# Patient Record
Sex: MALE | Race: WHITE | NOT HISPANIC OR LATINO | Employment: FULL TIME | URBAN - METROPOLITAN AREA
[De-identification: names, ages, dates, MRNs, and addresses within clinical notes are randomized per-mention and may not be internally consistent; named-entity substitution may affect disease eponyms.]

---

## 2024-07-18 ENCOUNTER — APPOINTMENT (OUTPATIENT)
Dept: PRIMARY CARE | Facility: CLINIC | Age: 29
End: 2024-07-18
Payer: COMMERCIAL

## 2024-07-31 ENCOUNTER — APPOINTMENT (OUTPATIENT)
Dept: PRIMARY CARE | Facility: CLINIC | Age: 29
End: 2024-07-31
Payer: COMMERCIAL

## 2024-07-31 ENCOUNTER — LAB (OUTPATIENT)
Dept: LAB | Facility: LAB | Age: 29
End: 2024-07-31
Payer: COMMERCIAL

## 2024-07-31 VITALS
BODY MASS INDEX: 24.34 KG/M2 | HEART RATE: 52 BPM | OXYGEN SATURATION: 97 % | HEIGHT: 70 IN | DIASTOLIC BLOOD PRESSURE: 65 MMHG | SYSTOLIC BLOOD PRESSURE: 110 MMHG | RESPIRATION RATE: 16 BRPM | WEIGHT: 170 LBS

## 2024-07-31 DIAGNOSIS — G89.29 CHRONIC PAIN OF LEFT KNEE: ICD-10-CM

## 2024-07-31 DIAGNOSIS — M25.562 CHRONIC PAIN OF LEFT KNEE: ICD-10-CM

## 2024-07-31 DIAGNOSIS — S29.011A PECTORAL MUSCLE RUPTURE, INITIAL ENCOUNTER: ICD-10-CM

## 2024-07-31 DIAGNOSIS — Z00.00 ANNUAL PHYSICAL EXAM: ICD-10-CM

## 2024-07-31 DIAGNOSIS — M25.551 RIGHT HIP PAIN: ICD-10-CM

## 2024-07-31 DIAGNOSIS — Z00.00 ANNUAL PHYSICAL EXAM: Primary | ICD-10-CM

## 2024-07-31 PROBLEM — H52.203 ASTIGMATISM, BILATERAL: Status: ACTIVE | Noted: 2024-07-31

## 2024-07-31 PROBLEM — H10.12: Status: RESOLVED | Noted: 2024-07-31 | Resolved: 2024-07-31

## 2024-07-31 PROBLEM — H04.123 DRY EYES, BILATERAL: Status: ACTIVE | Noted: 2024-07-31

## 2024-07-31 PROBLEM — H52.7 REFRACTIVE ERROR: Status: ACTIVE | Noted: 2024-07-31

## 2024-07-31 PROBLEM — H01.019 BLEPHARITIS, ULCERATIVE: Status: ACTIVE | Noted: 2024-07-31

## 2024-07-31 LAB
ALBUMIN SERPL BCP-MCNC: 4.3 G/DL (ref 3.4–5)
ALP SERPL-CCNC: 76 U/L (ref 33–120)
ALT SERPL W P-5'-P-CCNC: 16 U/L (ref 10–52)
ANION GAP SERPL CALC-SCNC: 10 MMOL/L (ref 10–20)
AST SERPL W P-5'-P-CCNC: 18 U/L (ref 9–39)
BASOPHILS # BLD AUTO: 0.02 X10*3/UL (ref 0–0.1)
BASOPHILS NFR BLD AUTO: 0.4 %
BILIRUB SERPL-MCNC: 0.8 MG/DL (ref 0–1.2)
BUN SERPL-MCNC: 14 MG/DL (ref 6–23)
CALCIUM SERPL-MCNC: 9.9 MG/DL (ref 8.6–10.6)
CHLORIDE SERPL-SCNC: 103 MMOL/L (ref 98–107)
CHOLEST SERPL-MCNC: 221 MG/DL (ref 0–199)
CHOLESTEROL/HDL RATIO: 3.5
CO2 SERPL-SCNC: 29 MMOL/L (ref 21–32)
CREAT SERPL-MCNC: 0.94 MG/DL (ref 0.5–1.3)
EGFRCR SERPLBLD CKD-EPI 2021: >90 ML/MIN/1.73M*2
EOSINOPHIL # BLD AUTO: 0.12 X10*3/UL (ref 0–0.7)
EOSINOPHIL NFR BLD AUTO: 2.6 %
ERYTHROCYTE [DISTWIDTH] IN BLOOD BY AUTOMATED COUNT: 13 % (ref 11.5–14.5)
GLUCOSE SERPL-MCNC: 87 MG/DL (ref 74–99)
HCT VFR BLD AUTO: 46.6 % (ref 41–52)
HDLC SERPL-MCNC: 63.7 MG/DL
HGB BLD-MCNC: 15.4 G/DL (ref 13.5–17.5)
IMM GRANULOCYTES # BLD AUTO: 0.01 X10*3/UL (ref 0–0.7)
IMM GRANULOCYTES NFR BLD AUTO: 0.2 % (ref 0–0.9)
LDLC SERPL CALC-MCNC: 146 MG/DL
LYMPHOCYTES # BLD AUTO: 1.54 X10*3/UL (ref 1.2–4.8)
LYMPHOCYTES NFR BLD AUTO: 33 %
MCH RBC QN AUTO: 28.8 PG (ref 26–34)
MCHC RBC AUTO-ENTMCNC: 33 G/DL (ref 32–36)
MCV RBC AUTO: 87 FL (ref 80–100)
MONOCYTES # BLD AUTO: 0.56 X10*3/UL (ref 0.1–1)
MONOCYTES NFR BLD AUTO: 12 %
NEUTROPHILS # BLD AUTO: 2.42 X10*3/UL (ref 1.2–7.7)
NEUTROPHILS NFR BLD AUTO: 51.8 %
NON HDL CHOLESTEROL: 157 MG/DL (ref 0–149)
NRBC BLD-RTO: 0 /100 WBCS (ref 0–0)
PLATELET # BLD AUTO: 240 X10*3/UL (ref 150–450)
POTASSIUM SERPL-SCNC: 4.3 MMOL/L (ref 3.5–5.3)
PROT SERPL-MCNC: 6.9 G/DL (ref 6.4–8.2)
RBC # BLD AUTO: 5.34 X10*6/UL (ref 4.5–5.9)
SODIUM SERPL-SCNC: 138 MMOL/L (ref 136–145)
TRIGL SERPL-MCNC: 57 MG/DL (ref 0–149)
TSH SERPL-ACNC: 1.29 MIU/L (ref 0.44–3.98)
VLDL: 11 MG/DL (ref 0–40)
WBC # BLD AUTO: 4.7 X10*3/UL (ref 4.4–11.3)

## 2024-07-31 PROCEDURE — 36415 COLL VENOUS BLD VENIPUNCTURE: CPT

## 2024-07-31 PROCEDURE — 80053 COMPREHEN METABOLIC PANEL: CPT

## 2024-07-31 PROCEDURE — 1036F TOBACCO NON-USER: CPT | Performed by: INTERNAL MEDICINE

## 2024-07-31 PROCEDURE — 99385 PREV VISIT NEW AGE 18-39: CPT | Performed by: INTERNAL MEDICINE

## 2024-07-31 PROCEDURE — 80061 LIPID PANEL: CPT

## 2024-07-31 PROCEDURE — 85025 COMPLETE CBC W/AUTO DIFF WBC: CPT

## 2024-07-31 PROCEDURE — 3008F BODY MASS INDEX DOCD: CPT | Performed by: INTERNAL MEDICINE

## 2024-07-31 PROCEDURE — 84443 ASSAY THYROID STIM HORMONE: CPT

## 2024-07-31 ASSESSMENT — ENCOUNTER SYMPTOMS
ENDOCRINE NEGATIVE: 1
RESPIRATORY NEGATIVE: 1
EYES NEGATIVE: 1
HEMATOLOGIC/LYMPHATIC NEGATIVE: 1
ALLERGIC/IMMUNOLOGIC NEGATIVE: 1
CONSTITUTIONAL NEGATIVE: 1
ARTHRALGIAS: 1
PSYCHIATRIC NEGATIVE: 1
CARDIOVASCULAR NEGATIVE: 1
NEUROLOGICAL NEGATIVE: 1
GASTROINTESTINAL NEGATIVE: 1

## 2024-07-31 NOTE — ASSESSMENT & PLAN NOTE
Due to trochanteric bursitis and get X rays since he reports  remote injuries and educate stretches and Educate strengthening exercises

## 2024-07-31 NOTE — ASSESSMENT & PLAN NOTE
Left knee pain due to strain and get X rays and consider PT and MRI and minimize strain with exercise

## 2024-07-31 NOTE — PROGRESS NOTES
"Subjective   Patient ID: Tho Allen is a 29 y.o. male who presents for New Patient Visit (New patient-est. Care/Pec muscle pull/Right hip labrum injury/Left knee tenderness/inflammation). Status Post several injuries  - Status Post fall skiing and several falls on the right side burning right hip after exercises possibly right hip trochanteric bursitis (mostly after squatting) - left nee inflammation no specific injury aching at times he has a [employer] hurts the most with reverse lounges left pectoral muscle tenderness due to possible strain     HPI     Review of Systems   Constitutional: Negative.    HENT: Negative.     Eyes: Negative.    Respiratory: Negative.     Cardiovascular: Negative.    Gastrointestinal: Negative.    Endocrine: Negative.    Musculoskeletal:  Positive for arthralgias.   Skin: Negative.    Allergic/Immunologic: Negative.    Neurological: Negative.    Hematological: Negative.    Psychiatric/Behavioral: Negative.     All other systems reviewed and are negative.      Objective   Pulse 73   Resp 16   Ht 1.778 m (5' 10\")   Wt 77.1 kg (170 lb)   SpO2 97%   BMI 24.39 kg/m²   Blood pressure 110/65, pulse 52, resp. rate 16, height 1.778 m (5' 10\"), weight 77.1 kg (170 lb), SpO2 97%.   Physical Exam  Vitals and nursing note reviewed.   Constitutional:       Appearance: Normal appearance.   HENT:      Head: Normocephalic and atraumatic.      Right Ear: Tympanic membrane, ear canal and external ear normal.      Left Ear: Tympanic membrane, ear canal and external ear normal. There is no impacted cerumen.      Nose: Nose normal.      Mouth/Throat:      Mouth: Mucous membranes are moist.      Pharynx: Oropharynx is clear.   Eyes:      Extraocular Movements: Extraocular movements intact.      Conjunctiva/sclera: Conjunctivae normal.      Pupils: Pupils are equal, round, and reactive to light.   Cardiovascular:      Rate and Rhythm: Normal rate and regular rhythm.      Pulses: Normal pulses.    "   Heart sounds: Normal heart sounds. No murmur heard.  Pulmonary:      Effort: Pulmonary effort is normal. No respiratory distress.      Breath sounds: Normal breath sounds. No stridor. No wheezing, rhonchi or rales.   Chest:      Chest wall: No tenderness.   Abdominal:      General: Abdomen is flat. Bowel sounds are normal. There is no distension.      Palpations: Abdomen is soft. There is no mass.      Tenderness: There is no abdominal tenderness. There is no right CVA tenderness, left CVA tenderness, guarding or rebound.      Hernia: No hernia is present.   Musculoskeletal:         General: Tenderness present. Normal range of motion.      Cervical back: Normal range of motion and neck supple.   Skin:     General: Skin is warm.      Capillary Refill: Capillary refill takes less than 2 seconds.   Neurological:      General: No focal deficit present.      Mental Status: He is alert.      Cranial Nerves: No cranial nerve deficit.      Sensory: No sensory deficit.      Motor: No weakness.      Coordination: Coordination normal.      Gait: Gait normal.      Deep Tendon Reflexes: Reflexes normal.   Psychiatric:         Mood and Affect: Mood normal.         Behavior: Behavior normal. Behavior is cooperative.         Thought Content: Thought content normal.         Judgment: Judgment normal.         Assessment/Plan   Problem List Items Addressed This Visit             ICD-10-CM    Annual physical exam - Primary Z00.00     No recent hospitalizations.    All medications reviewed and reconciled by me the provider..  No use of controlled substances or opiates.    Family history, social history reviewed, no changes.    Patient does not smoke.    Patient does not drink.    Patient hydrates adequately daily.  Eats a well-balanced healthy diet.     Exercises adequately including walking and doing weightbearing exercises.    Patient denies any difficulty with memory or cognition.     Denies any difficulty with hearing.  Patient  does not wear hearing aids.    No fall risk.  No recent falls.  Denies any difficulty walking.    Patient with no history of depression anxiety, denies any loss of interest, no feeling of sadness, no lack of motivation.           Relevant Orders    CBC and Auto Differential    Comprehensive Metabolic Panel    Lipid Panel    TSH with reflex to Free T4 if abnormal    Right hip pain M25.551     Due to trochanteric bursitis and get X rays since he reports  remote injuries and educate stretches and Educate strengthening exercises          Chronic pain of left knee M25.562, G89.29     Left knee pain due to strain and get X rays and consider PT and MRI and minimize strain with exercise          Pectoral muscle rupture, initial encounter S29.011A

## 2024-08-06 ENCOUNTER — HOSPITAL ENCOUNTER (OUTPATIENT)
Dept: RADIOLOGY | Facility: HOSPITAL | Age: 29
Discharge: HOME | End: 2024-08-06
Payer: COMMERCIAL

## 2024-08-06 DIAGNOSIS — G89.29 CHRONIC PAIN OF LEFT KNEE: ICD-10-CM

## 2024-08-06 DIAGNOSIS — M25.562 CHRONIC PAIN OF LEFT KNEE: ICD-10-CM

## 2024-08-06 DIAGNOSIS — M25.551 RIGHT HIP PAIN: ICD-10-CM

## 2024-08-06 PROCEDURE — 73560 X-RAY EXAM OF KNEE 1 OR 2: CPT | Mod: LEFT SIDE | Performed by: RADIOLOGY

## 2024-08-06 PROCEDURE — 73502 X-RAY EXAM HIP UNI 2-3 VIEWS: CPT | Mod: RIGHT SIDE | Performed by: RADIOLOGY

## 2024-08-06 PROCEDURE — 73502 X-RAY EXAM HIP UNI 2-3 VIEWS: CPT | Mod: RT

## 2024-08-06 PROCEDURE — 73560 X-RAY EXAM OF KNEE 1 OR 2: CPT | Mod: LT

## 2024-09-17 ENCOUNTER — OFFICE VISIT (OUTPATIENT)
Dept: ORTHOPEDIC SURGERY | Facility: HOSPITAL | Age: 29
End: 2024-09-17
Payer: COMMERCIAL

## 2024-09-17 ENCOUNTER — HOSPITAL ENCOUNTER (OUTPATIENT)
Dept: RADIOLOGY | Facility: HOSPITAL | Age: 29
Discharge: HOME | End: 2024-09-17
Payer: COMMERCIAL

## 2024-09-17 DIAGNOSIS — S29.011A PECTORALIS MUSCLE STRAIN, INITIAL ENCOUNTER: Primary | ICD-10-CM

## 2024-09-17 DIAGNOSIS — M25.512 LEFT SHOULDER PAIN, UNSPECIFIED CHRONICITY: ICD-10-CM

## 2024-09-17 PROCEDURE — 73030 X-RAY EXAM OF SHOULDER: CPT | Mod: LT

## 2024-09-17 PROCEDURE — 73030 X-RAY EXAM OF SHOULDER: CPT | Mod: LEFT SIDE | Performed by: RADIOLOGY

## 2024-09-17 PROCEDURE — 99204 OFFICE O/P NEW MOD 45 MIN: CPT | Performed by: FAMILY MEDICINE

## 2024-09-17 PROCEDURE — 99214 OFFICE O/P EST MOD 30 MIN: CPT | Performed by: FAMILY MEDICINE

## 2024-09-17 NOTE — PROGRESS NOTES
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    Today, 9/17/2024, patient presents with left lateral chest pain.  He reports May 2022, he was jumping up and swinging his arms up to get to his machinery when he felt pain on the left chest lateral to his nipple.  This intensified in June 2022 when he was in the gym doing heavy dips, incline presses, chest flies.  For the past 2 years, he has been trying to fix it himself with stretching, massage and rest.  However given that it is still not better, patient is now here for further evaluation.  He denies any further trauma or injury.  He has no difficulty with his job as a .  This only occurs when he does any chest workouts in the gym.    He has no other complaints.    Review of Systems  Constitutional: no fever, no chills, not feeling tired, no recent weight gain and no recent weight loss.   ENT: no nosebleeds.   Cardiovascular: no chest pain.   Respiratory: no shortness of breath and no cough.   Gastrointestinal: no abdominal pain, no nausea, no diarrhea and no vomiting.   Musculoskeletal: as noted in HPI and no arthralgias.   Integumentary: no rashes and no skin wound.   Neurological: no headache.   Psychiatric: no sleep disturbances and no depression.   Endocrine: no muscle weakness and no muscle cramps.   Hematologic/Lymphatic: no swollen glands and no tendency for easy bruising.    Physical Examination:     Left chest   Inspection: No obvious deformity of the left chest compared to contralateral side.  No erythema, ecchymosis, rash, lesion.  Range of motion: Full forward flexion, abduction, adduction, external rotation, internal rotation.  Notes at the end of range of motion of pulling sensation over the axilla.  Palpation: Mild tenderness to palpation in left axilla  over pec major insertion to humerus. Minimal tenderness of pec major clavicular head. No tenderness over sternocostal of pec major.   Strength: 5/5 pec major contraction though painful over left axilla over pec major insertion to humerus  Neurovascular intact.     Constitutional: General appearance = Alert and in no acute distress. Well developed, well nourished.   Head and face: Normal.    Eyes: External Eye, Conjunctiva and Lids=Normal external exam; pupils were equal in size, round, reactive to light (PERRL) and extraocular movements intact (EOMI).   Ears, Nose, Mouth, and Throat: External inspection of ears and nose=Normal.   Hearing= Normal.    Neck: No neck mass was observed. Supple.   Pulmonary: Respiratory effort = No respiratory distress.   Cardiovascular: Examination of extremities= No peripheral edema.   Skin and subcutaneous tissue: Normal skin color and pigmentation, normal skin turgor, and no rash; palpation of skin and subcutaneous tissue=Normal.    Neurologic: Sensation= Normal.    Psychiatric: Judgment and insight= Intact.  Orientation to person, place, and time: Alert and oriented x 3.  Mood and affect= Normal.    Imaging:  Radiographs of the left shoulder obtained by Dr. Herrera 9/17/2024 were reviewed and revealed calcification near the intertubercular groove of the humerus on oblique view.  The studies were reviewed by me personally in the office today.    Problem List Items Addressed This Visit    None  Visit Diagnoses         Codes    Pectoralis muscle strain, initial encounter    -  Primary S29.011A    Relevant Orders    MR shoulder left wo IV contrast    Left shoulder pain, unspecified chronicity     M25.512    Relevant Orders    XR shoulder left 2+ views    MR shoulder left wo IV contrast          Assessment and Plan:  We reviewed the exam and x-ray findings and discussed the conservative and surgical treatment options.  He likely has pectoralis major muscle strain with tendinopathy. Given  radiographs revealing calcification near the intertubercular groove of the humerus on oblique view, it is possible that patient had small tear of his pectoralis major muscle.  However, he does not have any obvious deformity on inspection or palpation of his pectoralis muscle and has full strength. We discussed a trial of formal physical therapy versus obtaining MRI left shoulder to further evaluate the extent of injury. We agreed to obtain MRI left shoulder and provided home exercise program for pectoral muscle strengthening.  Follow-up based on MRI results.    **This note was dictated using Dragon speech recognition software and was not corrected for spelling or grammatical errors**.    Jesus Urbano MD  Primary Care Sports Medicine Fellow  [facility] [place]  [facility]

## 2024-09-17 NOTE — LETTER
September 17, 2024     Leonardo Sarah MD  [address] [facility]  [facility] [address]    Patient: Tho Allen   YOB: 1995   Date of Visit: 9/17/2024       Dear Dr. Leonardo Sarah MD:    Thank you for referring Tho Allen to me for evaluation. Below are my notes for this consultation.  If you have questions, please do not hesitate to call me. I look forward to following your patient along with you.       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    Today, 9/17/2024, patient presents with left lateral chest pain.  He reports May 2022, he was jumping up and swinging his arms up to get to his machinery when he felt pain on the left chest lateral to his nipple.  This intensified in June 2022 when he was in the gym doing heavy dips, incline presses, chest flies.  For the past 2 years, he has been trying to fix it himself with stretching, massage and rest.  However given that it is still not better, patient is now here for further evaluation.  He denies any further trauma or injury.  He has no difficulty with his job as a .  This only occurs when he does any chest workouts in the gym.    He has no other complaints.    Review of Systems  Constitutional: no fever, no chills, not feeling tired, no recent weight gain and no recent weight loss.   ENT: no nosebleeds.   Cardiovascular: no chest pain.   Respiratory: no shortness of breath and no cough.   Gastrointestinal: no abdominal pain, no nausea, no diarrhea and no vomiting.   Musculoskeletal: as noted in HPI and no arthralgias.   Integumentary: no rashes and no skin  wound.   Neurological: no headache.   Psychiatric: no sleep disturbances and no depression.   Endocrine: no muscle weakness and no muscle cramps.   Hematologic/Lymphatic: no swollen glands and no tendency for easy bruising.    Physical Examination:     Left chest   Inspection: No obvious deformity of the left chest compared to contralateral side.  No erythema, ecchymosis, rash, lesion.  Range of motion: Full forward flexion, abduction, adduction, external rotation, internal rotation.  Notes at the end of range of motion of pulling sensation over the axilla.  Palpation: Mild tenderness to palpation in left axilla over pec major insertion to humerus. Minimal tenderness of pec major clavicular head. No tenderness over sternocostal of pec major.   Strength: 5/5 pec major contraction though painful over left axilla over pec major insertion to humerus  Neurovascular intact.     Constitutional: General appearance = Alert and in no acute distress. Well developed, well nourished.   Head and face: Normal.    Eyes: External Eye, Conjunctiva and Lids=Normal external exam; pupils were equal in size, round, reactive to light (PERRL) and extraocular movements intact (EOMI).   Ears, Nose, Mouth, and Throat: External inspection of ears and nose=Normal.   Hearing= Normal.    Neck: No neck mass was observed. Supple.   Pulmonary: Respiratory effort = No respiratory distress.   Cardiovascular: Examination of extremities= No peripheral edema.   Skin and subcutaneous tissue: Normal skin color and pigmentation, normal skin turgor, and no rash; palpation of skin and subcutaneous tissue=Normal.    Neurologic: Sensation= Normal.    Psychiatric: Judgment and insight= Intact.  Orientation to person, place, and time: Alert and oriented x 3.  Mood and affect= Normal.    Imaging:  Radiographs of the left shoulder obtained by Dr. Herrera 9/17/2024 were reviewed and revealed calcification near the intertubercular groove of the humerus on oblique  view.  The studies were reviewed by me personally in the office today.    Problem List Items Addressed This Visit    None  Visit Diagnoses         Codes    Pectoralis muscle strain, initial encounter    -  Primary S29.011A    Relevant Orders    MR shoulder left wo IV contrast    Left shoulder pain, unspecified chronicity     M25.512    Relevant Orders    XR shoulder left 2+ views    MR shoulder left wo IV contrast          Assessment and Plan:  We reviewed the exam and x-ray findings and discussed the conservative and surgical treatment options.  He likely has pectoralis major muscle strain with tendinopathy. Given radiographs revealing calcification near the intertubercular groove of the humerus on oblique view, it is possible that patient had small tear of his pectoralis major muscle.  However, he does not have any obvious deformity on inspection or palpation of his pectoralis muscle and has full strength. We discussed a trial of formal physical therapy versus obtaining MRI left shoulder to further evaluate the extent of injury. We agreed to obtain MRI left shoulder and provided home exercise program for pectoral muscle strengthening.  Follow-up based on MRI results.    **This note was dictated using Dragon speech recognition software and was not corrected for spelling or grammatical errors**.    Jesus Urbano MD  Primary Care Sports Medicine Fellow  [facility] [place]  [facility]    Attestation with edits by Placido Herrera MD at 9/17/2024  2:18 PM:    Attending Note     Trainee role: Fellow    Trainee discussed patient with Dr. Herrera          I saw and evaluated the patient. I personally obtained the key and critical portions of the history and physical exam or was physically present for key and critical portions performed by the trainee. I reviewed the trainee's documentation and discussed the patient with the trainee. I agree with the trainee's medical decision  making, as documented on the trainee's note.     **He has a strain to the myotendinous junction of the left pectoralis major muscle with some periosteal changes at the insertion of the proximal humerus.  I do not feel he needs immediate surgery but we will try to obtain an MRI for further evaluation of the area causing his chronic pain.    Placido Herrera MD  Sports Medicine Specialist  [facility] [place]

## 2024-10-03 ENCOUNTER — HOSPITAL ENCOUNTER (OUTPATIENT)
Dept: RADIOLOGY | Facility: HOSPITAL | Age: 29
Discharge: HOME | End: 2024-10-03
Payer: COMMERCIAL

## 2024-10-03 DIAGNOSIS — M25.512 LEFT SHOULDER PAIN, UNSPECIFIED CHRONICITY: ICD-10-CM

## 2024-10-03 DIAGNOSIS — S29.011A PECTORALIS MUSCLE STRAIN, INITIAL ENCOUNTER: ICD-10-CM

## 2024-10-03 PROCEDURE — 73221 MRI JOINT UPR EXTREM W/O DYE: CPT | Mod: LT

## 2024-10-08 ENCOUNTER — OFFICE VISIT (OUTPATIENT)
Dept: ORTHOPEDIC SURGERY | Facility: HOSPITAL | Age: 29
End: 2024-10-08
Payer: COMMERCIAL

## 2024-10-08 DIAGNOSIS — M25.512 LEFT SHOULDER PAIN, UNSPECIFIED CHRONICITY: ICD-10-CM

## 2024-10-08 DIAGNOSIS — S29.011D PECTORALIS MUSCLE STRAIN, SUBSEQUENT ENCOUNTER: Primary | ICD-10-CM

## 2024-10-08 PROCEDURE — 99213 OFFICE O/P EST LOW 20 MIN: CPT | Performed by: FAMILY MEDICINE

## 2024-10-08 NOTE — PROGRESS NOTES
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    On 9/17/2024, patient presents with left lateral chest pain.  He reports May 2022, he was jumping up and swinging his arms up to get to his machinery when he felt pain on the left chest lateral to his nipple.  This intensified in June 2022 when he was in the gym doing heavy dips, incline presses, chest flies.  For the past 2 years, he has been trying to fix it himself with stretching, massage and rest.  However given that it is still not better, patient is now here for further evaluation.  He denies any further trauma or injury.  He has no difficulty with his job as a .  This only occurs when he does any chest workouts in the gym. We discussed he likely has a pectoralis major strain with tendinopathy. We agreed to an MRI of the left shoulder to evaluate for a possible tear and will follow-up after to discuss results.    Today, on 10/8/2024, he returns for follow-up and to review his MRI results. He says his pain is unchanged and continues to be in the left lateral chest and part of his axilla. He is still able to do his daily activities, but has continued to modify activities at the gym because of the pain. He says that he had done physical therapy in the past informally, and he is not currently doing any type of physical therapy for this.    He has no other complaints.    Physical Examination:     LEFT CHEST EXAM:  Inspection: No obvious deformity of the left chest compared to contralateral side.  No erythema, ecchymosis, rash, lesion.  Range of motion: Full forward flexion, abduction, adduction, external rotation, internal rotation.  Palpation: Mild tenderness to palpation in the left axilla over the pectoralis major insertion to humerus, as well as in the left lateral  chest over the clavicular head. No tenderness over the sternocostal head.  Strength: 5/5 pectoralis major contraction although painful over the left axilla and lateral chest wall with resisted internal rotation  Neurovascularly intact.     Imaging:  === 10/3/2024 ===  MR SHOULDER LEFT WO IV CONTRAST  IMPRESSION:  No evidence of internal derangement left shoulder.  Only the distal portions of the pectoralis are visualized on this examination but the portions visualized do not confirm any sizable pectoralis injury. Dedicated complete pectoralis study could be considered if there is a high degree of clinical concern for medial pectoralis injury.  Signed by: Mukul Lennon 10/4/2024 5:59 AM    === 9/17/2024 ===  XR SHOULDER LEFT 2+ VIEWS  IMPRESSION:  Normal radiographs of the left shoulder  Signed by: Brendan Retana 9/18/2024 8:13 PM    Problem List Items Addressed This Visit    None  Visit Diagnoses         Codes    Pectoralis muscle strain, subsequent encounter    -  Primary S29.011D    Left shoulder pain, unspecified chronicity     M25.512          Assessment and Plan:  We reviewed the exam and x-ray findings and discussed the conservative and surgical treatment options. We discussed that his MRI did not demonstrate a tear to the pectoralis major or other acute findings that would correlate with his ongoing pain. His pain and exam are relatively unchanged compared to his last visit. At this time, we discussed that we could try restarting formal physical therapy to see if this helps with his pain. We could also consider referral to a shoulder surgeon for another evaluation to see if there is anything interventional that can be done to help him. He would like a referral at this time. He was referred to one of our orthopedic surgery shoulder colleagues, Dr. Jaspreet Daugherty.    **This note was dictated using Dragon speech recognition software and was not corrected for spelling or grammatical errors**.    Maykel Nicolas,  MD, MEd  Primary Care Sports Medicine Fellow, PGY-4  [facility]

## 2024-10-25 ENCOUNTER — OFFICE VISIT (OUTPATIENT)
Dept: ORTHOPEDIC SURGERY | Facility: HOSPITAL | Age: 29
End: 2024-10-25
Payer: COMMERCIAL

## 2024-10-25 VITALS — HEIGHT: 70 IN | WEIGHT: 170 LBS | BODY MASS INDEX: 24.34 KG/M2

## 2024-10-25 DIAGNOSIS — S29.011D PECTORALIS MUSCLE STRAIN, SUBSEQUENT ENCOUNTER: Primary | ICD-10-CM

## 2024-10-25 PROCEDURE — 3008F BODY MASS INDEX DOCD: CPT | Performed by: STUDENT IN AN ORGANIZED HEALTH CARE EDUCATION/TRAINING PROGRAM

## 2024-10-25 PROCEDURE — 99213 OFFICE O/P EST LOW 20 MIN: CPT | Performed by: STUDENT IN AN ORGANIZED HEALTH CARE EDUCATION/TRAINING PROGRAM

## 2024-10-25 ASSESSMENT — PAIN - FUNCTIONAL ASSESSMENT: PAIN_FUNCTIONAL_ASSESSMENT: 0-10

## 2024-10-25 ASSESSMENT — PAIN SCALES - GENERAL: PAINLEVEL_OUTOF10: 1

## 2024-10-30 NOTE — PROGRESS NOTES
PRIMARY CARE PHYSICIAN: Leonardo Sarah MD  REFERRING PROVIDER: No referring provider defined for this encounter.     CONSULT ORTHOPAEDIC: Shoulder Evaluation    ASSESSMENT & PLAN    Impression/Plan: This is a 29-year-old male who presents for evaluation of left chest wall pain.  Recent MRI of the left shoulder reveals no evidence of internal derangement.  The patient is concerned about his pectoralis musculature.  Based on his young age and persistence of symptoms despite a home exercise program and prolonged therapy we have recommended moving forward with an MRI of the left chest to evaluate his pectoralis musculature and tendon.  He is in understanding agree with this plan.  He will follow-up after the MRI discuss findings and further management options.    SUBJECTIVE  CHIEF COMPLAINT:   Chief Complaint   Patient presents with    Left Shoulder - Pain        HPI: Tho Allen is a 29 y.o. patient. Tho Allen presents for evaluation of left chest wall pain.  He is most recently evaluated by our sports medicine colleagues on 10/8/2024.  He was worked up with an MRI of the left shoulder which did not reveal any intra-articular or rotator cuff issues.  He continues to have chest wall pain and is interested in discussion of possible pack injury.  The patient reports that in May 2022 while on machinery he felt a pain to the left lateral chest wall.  This intensified in June 2022 and for the past 2 years he has been working on home exercise program without significant improvements of his pain.  He has no previous history of shoulder surgeries or injections.  There are no reports of neck pain.  No reports of distal numbness or tingling.    REVIEW OF SYSTEMS  Constitutional: See HPI for pain assessment, No significant weight loss, recent trauma  Cardiovascular: No chest pain, shortness of breath  Respiratory: No difficulty breathing, cough  Gastrointestinal: No nausea, vomiting, diarrhea, constipation  Musculoskeletal:  "Noted in HPI, positive for pain, restricted motion, stiffness  Integumentary: No rashes, easy bruising, redness   Neurological: no numbness or tingling in extremities, no gait disturbances   Psychiatric: No mood changes, memory changes, social issues  Heme/Lymph: no excessive swelling, easy bruising, excessive bleeding  ENT: No hearing changes  Eyes: No vision changes    Past Medical History:   Diagnosis Date    Allergic conjunctivitis, left eye 07/31/2024    Other specified postprocedural states     History of surgery on arm        No Known Allergies     No past surgical history on file.     No family history on file.     Social History     Socioeconomic History    Marital status: Single     Spouse name: Not on file    Number of children: Not on file    Years of education: Not on file    Highest education level: Not on file   Occupational History    Not on file   Tobacco Use    Smoking status: Never    Smokeless tobacco: Never   Substance and Sexual Activity    Alcohol use: Yes     Comment: social    Drug use: Never    Sexual activity: Not on file   Other Topics Concern    Not on file   Social History Narrative    Not on file     Social Drivers of Health     Financial Resource Strain: Not on file   Food Insecurity: Not on file   Transportation Needs: Not on file   Physical Activity: Not on file   Stress: Not on file   Social Connections: Not on file   Intimate Partner Violence: Not on file   Housing Stability: Not on file        CURRENT MEDICATIONS:   Current Outpatient Medications   Medication Sig Dispense Refill    geqberv-tcgpdt-sxzatkpx-tretin 5-0.1-0.1-0.025 % solution Apply topically once daily.       No current facility-administered medications for this visit.        OBJECTIVE    PHYSICAL EXAM      7/31/2024     9:57 AM 10/25/2024     9:50 AM   Vitals   Systolic 110    Diastolic 65    Heart Rate 52    Resp 16    Height (in) 1.778 m (5' 10\") 1.778 m (5' 10\")   Weight (lb) 170 170   BMI 24.39 kg/m2 24.39 kg/m2 "   BSA (m2) 1.95 m2 1.95 m2   Visit Report Report Report      Body mass index is 24.39 kg/m².    GENERAL: A/Ox3, NAD. Appears healthy, well nourished  PSYCHIATRIC: Mood stable, appropriate memory recall  EYES: EOM intact, no scleral icterus  CARDIAC: regular rate  LUNGS: Breathing non-labored  SKIN: no erythema, rashes, or ecchymoses     MUSCULOSKELETAL:  This is a well-appearing patient in no acute distress.    There is no tenderness to palpation along the SC joint, AC joint, clavicle, acromion, or spine of the scapula.  There is no tenderness along the proximal aspect of the biceps tendon.  There is mild tenderness along the insertion of the pectoralis minor at the area of the coracoid.  Active range of motion: forward flexion 160 degrees, abduction 150 degrees, external rotation 30 degrees, internal rotation to T7.  Strength: 5/5 to the supraspinatus, infraspinatus, subscapularis.  Negative Hornblower's.  Stability: Anterior/Posterior load and shift stable  Negative Speed's and Yergason's.  Negative [place]'s.  Negative Neer and Bishop.  The patient is firing the AIN/PIN/median/radial/ulnar/axillary distributions.  The patient is sensate to light touch in the median/radial/ulnar/axillary distributions.  2+ radial pulse.    NEUROVASCULAR:  - Neurovascular Status: sensation intact to light touch distally, upper extremity motor grossly intact  - Capillary refill brisk at extremities, radial pulse 2+    Imaging: Multiple views of the affected left shoulder(s) demonstrate: No evidence of fracture or dislocation.  Well-preserved glenohumeral joint space.  Well-preserved acromiohumeral interval.   X-rays were personally reviewed and interpreted by me.  Radiology reports were reviewed by me as well, if readily available at the time.    MRI of the left shoulder dated 10/3/2024 reveals no evidence of intra-articular derangement of the shoulder.    Jaspreet Daugherty MD, MPH  Attending Surgeon  Sports Medicine  Orthopaedic Surgery  [facility] [place]

## 2024-11-13 ENCOUNTER — HOSPITAL ENCOUNTER (OUTPATIENT)
Dept: RADIOLOGY | Facility: HOSPITAL | Age: 29
Discharge: HOME | End: 2024-11-13
Payer: COMMERCIAL

## 2024-11-13 DIAGNOSIS — S29.011D PECTORALIS MUSCLE STRAIN, SUBSEQUENT ENCOUNTER: ICD-10-CM

## 2024-11-13 PROCEDURE — 71550 MRI CHEST W/O DYE: CPT

## 2024-11-13 PROCEDURE — 71550 MRI CHEST W/O DYE: CPT | Performed by: RADIOLOGY

## 2024-11-27 ENCOUNTER — APPOINTMENT (OUTPATIENT)
Dept: ORTHOPEDIC SURGERY | Facility: CLINIC | Age: 29
End: 2024-11-27
Payer: COMMERCIAL

## 2024-12-03 ENCOUNTER — APPOINTMENT (OUTPATIENT)
Dept: ORTHOPEDIC SURGERY | Facility: CLINIC | Age: 29
End: 2024-12-03
Payer: COMMERCIAL

## 2024-12-03 DIAGNOSIS — S46.912S SHOULDER STRAIN, LEFT, SEQUELA: Primary | ICD-10-CM

## 2024-12-03 PROCEDURE — 99442 PR PHYS/QHP TELEPHONE EVALUATION 11-20 MIN: CPT | Performed by: STUDENT IN AN ORGANIZED HEALTH CARE EDUCATION/TRAINING PROGRAM

## 2025-02-05 NOTE — PROGRESS NOTES
This is documentation of a telehealth visit held with the patient on 12/3/2024.  The services were conducted utilizing audio modality.  I performed this visit using real-time telehealth tools, including an audio connection.  Connection was maintained between myself, Eh Daugherty located in [facility], and the patient located in [place], [place].  I obtained verbal consent from the patient for this evaluation.    The patient presents for virtual televisit status post initial in person evaluation 10/25/2024.  He complained of recurrent left chest wall pain and there was concern for pectoralis musculature injury.  MRI of the chest dated 11/14/2024 reveals no evidence of acute injury.  This was discussed with the patient.  He would like to move forward with physical therapy on an as-needed basis moving forward.  He will follow-up in our office as needed moving forward.  All questions were answered.

## 2025-04-01 ENCOUNTER — APPOINTMENT (OUTPATIENT)
Dept: PRIMARY CARE | Facility: CLINIC | Age: 30
End: 2025-04-01
Payer: COMMERCIAL

## 2025-04-03 ENCOUNTER — APPOINTMENT (OUTPATIENT)
Dept: PRIMARY CARE | Facility: CLINIC | Age: 30
End: 2025-04-03
Payer: COMMERCIAL

## 2025-05-19 ENCOUNTER — APPOINTMENT (OUTPATIENT)
Dept: ALLERGY | Facility: CLINIC | Age: 30
End: 2025-05-19
Payer: COMMERCIAL

## 2025-07-15 ENCOUNTER — OFFICE VISIT (OUTPATIENT)
Dept: URGENT CARE | Age: 30
End: 2025-07-15
Payer: COMMERCIAL

## 2025-07-15 VITALS
TEMPERATURE: 98.2 F | OXYGEN SATURATION: 99 % | RESPIRATION RATE: 18 BRPM | DIASTOLIC BLOOD PRESSURE: 84 MMHG | SYSTOLIC BLOOD PRESSURE: 148 MMHG | HEART RATE: 68 BPM

## 2025-07-15 DIAGNOSIS — L24.7 IRRITANT CONTACT DERMATITIS DUE TO PLANTS, EXCEPT FOOD: Primary | ICD-10-CM

## 2025-07-15 RX ORDER — METHYLPREDNISOLONE 4 MG/1
TABLET ORAL
Qty: 21 TABLET | Refills: 0 | Status: SHIPPED | OUTPATIENT
Start: 2025-07-15 | End: 2025-07-21

## 2025-07-15 RX ORDER — TRIAMCINOLONE ACETONIDE 1 MG/G
CREAM TOPICAL 2 TIMES DAILY
Qty: 30 G | Refills: 0 | Status: SHIPPED | OUTPATIENT
Start: 2025-07-15

## 2025-07-15 ASSESSMENT — PATIENT HEALTH QUESTIONNAIRE - PHQ9
1. LITTLE INTEREST OR PLEASURE IN DOING THINGS: NOT AT ALL
2. FEELING DOWN, DEPRESSED OR HOPELESS: NOT AT ALL
SUM OF ALL RESPONSES TO PHQ9 QUESTIONS 1 AND 2: 0

## 2025-07-15 NOTE — PROGRESS NOTES
Subjective   Patient ID: Tho Allen is a 30 y.o. male. They present today with a chief complaint of Poison Ivy (On face neck and arms and possibly his man parts. Just flared up today).    History of Present Illness    Poison Ivy  Associated symptoms: rash        Past Medical History  Allergies as of 07/15/2025    (No Known Allergies)       Prescriptions Prior to Admission[1]     Medical History[2]    Surgical History[3]     reports that he has never smoked. He has never used smokeless tobacco. He reports current alcohol use. He reports that he does not use drugs.    Review of Systems  Review of Systems   Skin:  Positive for rash.   All other systems reviewed and are negative.                                 Objective    Vitals:    07/15/25 0810   BP: 148/84   Pulse: 68   Resp: 18   Temp: 36.8 °C (98.2 °F)   SpO2: 99%     No LMP for male patient.    Physical Exam  Vitals and nursing note reviewed.   Constitutional:       Appearance: Normal appearance.   HENT:      Head: Normocephalic.      Nose: Nose normal.      Mouth/Throat:      Mouth: Mucous membranes are moist.      Pharynx: Oropharynx is clear.   Eyes:      Extraocular Movements: Extraocular movements intact.      Pupils: Pupils are equal, round, and reactive to light.   Cardiovascular:      Rate and Rhythm: Normal rate and regular rhythm.      Pulses: Normal pulses.      Heart sounds: Normal heart sounds.   Pulmonary:      Effort: Pulmonary effort is normal.      Breath sounds: Normal breath sounds.   Musculoskeletal:         General: Normal range of motion.      Cervical back: Normal range of motion and neck supple.   Skin:     General: Skin is warm and dry.      Findings: Erythema and rash present. Rash is macular and papular.      Comments: Intact, diffuse rash   Neurological:      General: No focal deficit present.      Mental Status: He is alert and oriented to person, place, and time.   Psychiatric:         Mood and Affect: Mood normal.         Behavior:  Behavior normal.         Procedures    Point of Care Test & Imaging Results from this visit  No results found for this visit on 07/15/25.   Imaging  No results found.    Cardiology, Vascular, and Other Imaging  No other imaging results found for the past 2 days      Diagnostic study results (if any) were reviewed by ANIA Goldsmith.    Assessment/Plan   Allergies, medications, history, and pertinent labs/EKGs/Imaging reviewed by ANIA Goldsmith.     Medical Decision Making    PE consistent with rash d/t poison ivy, medrol berneice and triamcinolone cream prescribed  DO NOT itch or pick  Cool pearson  Calamine lotion and aloe  If s/s worsen, go to ER    At time of discharge, patient was clinically well-appearing and appropriate for outpatient management. The patient/parent/guardian was educated regarding diagnosis, supportive care, OTC and Rx medications. The patient/parent/guardian was given the opportunity to ask questions prior to discharge. They verbalized understanding of discussion of treatment plan, expected course of illness and/or injury, indications on when to return to , when to seek further evaluation in ED/call 911, and the need to follow up with PCP and/or specialist as referred. Patient/parent/guardian was provided with work/school documentation if requested. Patient stable upon discharge     Follow up Care: Pt instructed to follow-up with PCP or other appropriate clinician within 24 to 48 hours. Report to ED if there is any development in worsening pain, difficulty swallowing, change in phonation, fever, chills, neck pain, photophobia, headache, neck stiffness, chest pain, abdominal pain, vomiting, syncope, hemoptysis, leg swelling SOB, fever, facial swelling, eye pain, periorbital swelling/erythema, or any new signs or sx.     The patient was educated regarding diagnosis, supportive care, OTC and Rx medications. The patient was given the opportunity to ask questions prior to  discharge. They verbalized understanding of my discussion of the plans for treatment, expected course, indications to return to  or seek further evaluation in ED, and the need for timely follow up as directed.       Dermatitis is the general name used for any rash or inflammation of the skin. Different kinds of dermatitis cause different kinds of rashes. Common causes of a rash include new medicines, plants (such as poison ivy or poison sumac), heat, and stress. Certain illnesses can also cause a rash.  An allergic reaction to something that touches your skin, such as latex, nickel, or poison ivy, is called contact dermatitis. Contact dermatitis may also be caused by something that irritates the skin, such as bleach, a chemical, or soap. These types of rashes cannot be spread from person to person.  How long your rash will last depends on what caused it. Rashes may last a few days or months.  Follow-up care is a key part of your treatment and safety. Be sure to make and go to all appointments, and call your doctor or nurse advice line ([phone] in most provinces and [facility]) if you are having problems. It's also a good idea to know your test results and keep a list of the medicines you take.  How can you care for yourself at home?  Do not scratch the rash. Cut your nails short, and file them smooth. Or wear gloves if this helps keep you from scratching.  Wash the area with water only. Pat dry.  Put cold, wet cloths on the rash to reduce itching.  Keep cool, and stay out of the sun.  Leave the rash open to the air as much as possible.  If the rash itches, use hydrocortisone cream. Follow the directions on the label. Calamine lotion may help for plant rashes.  If itching affects your sleep, ask your doctor if you can take an antihistamine that might reduce itching and make you sleepy, such as diphenhydramine (Benadryl). Be safe with medicines. Read and follow all instructions on the label.  If your doctor prescribed  a cream, use it as directed. If your doctor prescribed medicine, take it exactly as directed.  When should you call for help?    Call your doctor or nurse advice line now or seek immediate medical care if:  You have symptoms of infection, such as:  Increased pain, swelling, warmth, or redness.  Red streaks leading from the area.  Pus draining from the area.  A fever.  You have joint pain along with the rash.      Orders and Diagnoses  Diagnoses and all orders for this visit:  Irritant contact dermatitis due to plants, except food  -     methylPREDNISolone (Medrol Dospak) 4 mg tablets; Follow schedule on package instructions  -     triamcinolone (Kenalog) 0.1 % cream; Apply topically 2 times a day.      Medical Admin Record      Patient disposition: Home    Electronically signed by ANIA Goldsmith  8:30 AM           [1] (Not in a hospital admission)   [2]   Past Medical History:  Diagnosis Date    Allergic conjunctivitis, left eye 07/31/2024    Other specified postprocedural states     History of surgery on arm   [3] No past surgical history on file.

## 2025-07-16 ENCOUNTER — TELEPHONE (OUTPATIENT)
Dept: URGENT CARE | Age: 30
End: 2025-07-16

## 2025-08-11 ENCOUNTER — CONSULT (OUTPATIENT)
Dept: ALLERGY | Facility: CLINIC | Age: 30
End: 2025-08-11
Payer: COMMERCIAL

## 2025-08-11 ENCOUNTER — APPOINTMENT (OUTPATIENT)
Dept: ALLERGY | Facility: CLINIC | Age: 30
End: 2025-08-11
Payer: COMMERCIAL

## 2025-08-11 VITALS
HEART RATE: 73 BPM | SYSTOLIC BLOOD PRESSURE: 125 MMHG | BODY MASS INDEX: 24.39 KG/M2 | DIASTOLIC BLOOD PRESSURE: 79 MMHG | HEIGHT: 70 IN

## 2025-08-11 DIAGNOSIS — T78.40XA MULTIPLE CHEMICAL SENSITIVITY SYNDROME, INITIAL ENCOUNTER: Primary | ICD-10-CM

## 2025-08-11 PROCEDURE — 95024 IQ TESTS W/ALLERGENIC XTRCS: CPT | Performed by: ALLERGY & IMMUNOLOGY

## 2025-08-11 PROCEDURE — 99204 OFFICE O/P NEW MOD 45 MIN: CPT | Performed by: ALLERGY & IMMUNOLOGY

## 2025-08-11 PROCEDURE — 95004 PERQ TESTS W/ALRGNC XTRCS: CPT | Performed by: ALLERGY & IMMUNOLOGY
